# Patient Record
Sex: MALE | Race: WHITE
[De-identification: names, ages, dates, MRNs, and addresses within clinical notes are randomized per-mention and may not be internally consistent; named-entity substitution may affect disease eponyms.]

---

## 2017-02-20 ENCOUNTER — HOSPITAL ENCOUNTER (EMERGENCY)
Dept: HOSPITAL 62 - ER | Age: 22
Discharge: HOME | End: 2017-02-20
Payer: OTHER GOVERNMENT

## 2017-02-20 VITALS — DIASTOLIC BLOOD PRESSURE: 62 MMHG | SYSTOLIC BLOOD PRESSURE: 94 MMHG

## 2017-02-20 DIAGNOSIS — F17.210: ICD-10-CM

## 2017-02-20 DIAGNOSIS — H57.12: ICD-10-CM

## 2017-02-20 DIAGNOSIS — B30.9: Primary | ICD-10-CM

## 2017-02-20 PROCEDURE — 99283 EMERGENCY DEPT VISIT LOW MDM: CPT

## 2017-02-20 NOTE — ER DOCUMENT REPORT
ED Medical Screen (RME)





- General


Stated Complaint: EYE CONCERN


Mode of Arrival: Ambulatory


Information source: Patient


Notes: 


20 y/o M presents to ED c/o left eye redness, gritty feeling, and crusting over 

the last 2 days. States cannot recall obvious injury. 





I have greeted and performed a rapid initial assessment of this patient. A 


comprehensive ED assessment and evaluation of the patient, analysis of test 


results and completion of the medical decision making process will be conducted 


by additional ED providers.


TRAVEL OUTSIDE OF THE [place] IN LAST 30 DAYS: No





- Related Data


Allergies/Adverse Reactions: 


 





No Known Allergies Allergy (Verified 12/02/16 18:00)


 











Past Medical History





- Immunizations


Immunizations up to date: Yes





Physical Exam





- Vital signs


Vitals: 





 











Temp Pulse Resp BP Pulse Ox


 


 98.3 F   60   16   94/62 L  100 


 


 02/20/17 11:40  02/20/17 11:40  02/20/17 11:40  02/20/17 11:40  02/20/17 11:40














- General


General appearance: Appears well, Alert


In distress: None





Course





- Vital Signs


Vital signs: 





 











Temp Pulse Resp BP Pulse Ox


 


 98.3 F   60   16   94/62 L  100 


 


 02/20/17 11:40  02/20/17 11:40  02/20/17 11:40  02/20/17 11:40  02/20/17 11:40

## 2017-02-20 NOTE — ER DOCUMENT REPORT
HPI





- HPI


Patient complains to provider of: LEFT EYE IRRITATION


Onset: Other - 2 DAYS


Pain Level: 1


Context: 


Patient presents to the emergency department with complaints of left eye 

irritation.  He reports he was in the field 2 days ago when his left eye 

started irritating him.  He reports some discharge when he wakes up in the 

morning.  Denies wearing contacts denies trauma.  Denies problems with his 

vision.  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  He reports he started to go to his [facility] 

but they were too slow so he came here.  Symptoms such as fever vomiting 

diarrhea.


Associated Symptoms: None


Exacerbated by: Denies


Relieved by: Denies


Similar symptoms previously: No


Recently seen / treated by doctor: No





- DERM


Skin Color: Normal





Past Medical History





- General


Information source: Patient





- Social History


Smoking Status: Current Some Day Smoker


Cigarette use (# per day): Yes


Chew tobacco use (# tins/day): No


Frequency of alcohol use: Occasional


Drug Abuse: None


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


Lives with: Family


Family History: Reviewed & Not Pertinent


Patient has suicidal ideation: No


Patient has homicidal ideation: No





- Medical History


Medical History: Negative


Renal/ Medical History: Denies: Hx Peritoneal Dialysis


Surgical Hx: Negative





- Immunizations


Immunizations up to date: Yes





Vertical Provider Document





- CONSTITUTIONAL


Agree With Documented VS: Yes


Exam Limitations: No Limitations


General Appearance: WD/WN





- INFECTION CONTROL


TRAVEL OUTSIDE OF THE [place] IN LAST 30 DAYS: No





- HEENT


HEENT: Atraumatic, Normocephalic, PERRLA.  negative: Conjuctival Injection





- NECK


Neck: Supple





- RESPIRATORY


Respiratory: Breath Sounds Normal, No Respiratory Distress


O2 Sat by Pulse Oximetry: 100





- CARDIOVASCULAR


Cardiovascular: Regular Rate





- MUSCULOSKELETAL/EXTREMETIES


Musculoskeletal/Extremeties: MAEW, FROM





- NEURO


Level of Consciousness: Awake, Alert, Appropriate





- DERM


Integumentary: Warm, Dry





Course





- Re-evaluation


Re-evalutation: 


02/20/17  


Patient was instructed on viral conjunctivitis.  Patient was also instructed on 

importance of follow-up with his [facility] for recheck.


 Visual acuity was done with no deficits noted








- Vital Signs


Vital signs: 


 











Temp Pulse Resp BP Pulse Ox


 


 98.3 F   60   16   94/62 L  100 


 


 02/20/17 11:40  02/20/17 11:40  02/20/17 11:40  02/20/17 11:40  02/20/17 11:40














Procedures





- Eye Procedure


  ** Left


Eye Irrigated w/ Saline (ccs): 10


Alcaine Drops Administered: No - tetracaine


Fluorescein applied: Left


Notes: 





02/20/17 20:48


Patient reports immediate RELIEF OF IRRITATION after receiving tetracaine 





Discharge





- Discharge


Clinical Impression: 


 Irritation of left eye, Viral conjunctivitis of left eye





Condition: Stable


Disposition: HOME, SELF-CARE


Instructions:  Conjunctivitis, Allergic


Additional Instructions: 


*You have been evaluated for eye irritation, conjunctivitis


*Use refresh or clear eyes eye drops as INDICATED


*Follow up with your [facility] tomorrow for recheck


*Return to ED for worsening condition, changes, needs

## 2019-02-19 ENCOUNTER — HOSPITAL ENCOUNTER (EMERGENCY)
Dept: HOSPITAL 62 - ER | Age: 24
LOS: 1 days | Discharge: HOME | End: 2019-02-20
Payer: OTHER GOVERNMENT

## 2019-02-19 DIAGNOSIS — R10.84: ICD-10-CM

## 2019-02-19 DIAGNOSIS — R11.0: ICD-10-CM

## 2019-02-19 DIAGNOSIS — R42: ICD-10-CM

## 2019-02-19 DIAGNOSIS — E80.6: Primary | ICD-10-CM

## 2019-02-19 DIAGNOSIS — R53.1: ICD-10-CM

## 2019-02-19 LAB
ADD MANUAL DIFF: NO
ALBUMIN SERPL-MCNC: 5.3 G/DL (ref 3.5–5)
ALP SERPL-CCNC: 67 U/L (ref 38–126)
ALT SERPL-CCNC: 26 U/L (ref 21–72)
ANION GAP SERPL CALC-SCNC: 16 MMOL/L (ref 5–19)
APPEARANCE UR: CLEAR
APTT PPP: YELLOW S
AST SERPL-CCNC: 19 U/L (ref 17–59)
BASOPHILS # BLD AUTO: 0 10^3/UL (ref 0–0.2)
BASOPHILS NFR BLD AUTO: 0.1 % (ref 0–2)
BILIRUB DIRECT SERPL-MCNC: 0.2 MG/DL (ref 0–0.4)
BILIRUB SERPL-MCNC: 2.6 MG/DL (ref 0.2–1.3)
BILIRUB UR QL STRIP: NEGATIVE
BUN SERPL-MCNC: 8 MG/DL (ref 7–20)
CALCIUM: 10.1 MG/DL (ref 8.4–10.2)
CHLORIDE SERPL-SCNC: 99 MMOL/L (ref 98–107)
CO2 SERPL-SCNC: 25 MMOL/L (ref 22–30)
EOSINOPHIL # BLD AUTO: 0.3 10^3/UL (ref 0–0.6)
EOSINOPHIL NFR BLD AUTO: 3.5 % (ref 0–6)
ERYTHROCYTE [DISTWIDTH] IN BLOOD BY AUTOMATED COUNT: 13.4 % (ref 11.5–14)
GLUCOSE SERPL-MCNC: 77 MG/DL (ref 75–110)
GLUCOSE UR STRIP-MCNC: NEGATIVE MG/DL
HCT VFR BLD CALC: 45.4 % (ref 37.9–51)
HGB BLD-MCNC: 15.9 G/DL (ref 13.5–17)
KETONES UR STRIP-MCNC: 80 MG/DL
LIPASE SERPL-CCNC: 58.4 U/L (ref 23–300)
LYMPHOCYTES # BLD AUTO: 3.3 10^3/UL (ref 0.5–4.7)
LYMPHOCYTES NFR BLD AUTO: 44.6 % (ref 13–45)
MCH RBC QN AUTO: 30 PG (ref 27–33.4)
MCHC RBC AUTO-ENTMCNC: 35.1 G/DL (ref 32–36)
MCV RBC AUTO: 86 FL (ref 80–97)
MONOCYTES # BLD AUTO: 0.7 10^3/UL (ref 0.1–1.4)
MONOCYTES NFR BLD AUTO: 9.4 % (ref 3–13)
NEUTROPHILS # BLD AUTO: 3.1 10^3/UL (ref 1.7–8.2)
NEUTS SEG NFR BLD AUTO: 42.4 % (ref 42–78)
NITRITE UR QL STRIP: NEGATIVE
PH UR STRIP: 5 [PH] (ref 5–9)
PLATELET # BLD: 253 10^3/UL (ref 150–450)
POTASSIUM SERPL-SCNC: 4.2 MMOL/L (ref 3.6–5)
PROT SERPL-MCNC: 7.6 G/DL (ref 6.3–8.2)
PROT UR STRIP-MCNC: NEGATIVE MG/DL
RBC # BLD AUTO: 5.3 10^6/UL (ref 4.35–5.55)
SODIUM SERPL-SCNC: 139.5 MMOL/L (ref 137–145)
SP GR UR STRIP: 1.01
TOTAL CELLS COUNTED % (AUTO): 100 %
UROBILINOGEN UR-MCNC: NEGATIVE MG/DL (ref ?–2)
WBC # BLD AUTO: 7.4 10^3/UL (ref 4–10.5)

## 2019-02-19 PROCEDURE — 36415 COLL VENOUS BLD VENIPUNCTURE: CPT

## 2019-02-19 PROCEDURE — 80053 COMPREHEN METABOLIC PANEL: CPT

## 2019-02-19 PROCEDURE — 76705 ECHO EXAM OF ABDOMEN: CPT

## 2019-02-19 PROCEDURE — 85025 COMPLETE CBC W/AUTO DIFF WBC: CPT

## 2019-02-19 PROCEDURE — 83690 ASSAY OF LIPASE: CPT

## 2019-02-19 PROCEDURE — 81001 URINALYSIS AUTO W/SCOPE: CPT

## 2019-02-19 PROCEDURE — 99284 EMERGENCY DEPT VISIT MOD MDM: CPT

## 2019-02-19 NOTE — RADIOLOGY REPORT (SQ)
EXAM DESCRIPTION: 



US ABDOMEN LIMITED



COMPLETED DATE/TME:  02/19/2019 22:31



CLINICAL HISTORY: 



23 years, Male, Abdominal pain



COMPARISON:

None.



TECHNIQUE:

Limited right upper quadrant ultrasound



LIMITATIONS:

None.



FINDINGS:



The liver is homogenous in echotexture without focal lesion. No

gallstones or gallbladder wall thickening. The CBD measures 2.4

mm. The visualized pancreas, right kidney are unremarkable. No

ascites. Visualized abdominal aorta and inferior vena cava are

unremarkable.



IMPRESSION:



Unremarkable exam

 



copyright 2011 [employer]- All Rights Reserved

## 2019-02-19 NOTE — ER DOCUMENT REPORT
ED Dizziness/Weakness





- General


Chief Complaint: Dizziness


Stated Complaint: WEAK,NAUSEA


Time Seen by Provider: 02/19/19 21:40


Primary Care Provider: 


CHE EPPERSON MD [ACTIVE STAFF] - Follow up as needed


Mode of Arrival: Ambulatory


Information source: Patient


Notes: 





HISTORY OF PRESENT ILLNESS:





Patient is a 23-year-old male with no significant past medical history who 

presents with nausea and right upper quadrant abdominal pain.





Location: Right upper quadrant


Onset: Gradual


Alleviation: "Some positions are better"


Provocation: None


Quality: Aching, tightness


Radiation: None


Severity: Moderate at worst, currently mild


Timing: Intermittent


History of abdominal surgery: None


Associated symptoms: Denies fevers or chills, no diarrhea or constipation, no 

known sick contacts, no new medications, no recent travel


Last bowel movement: Today and normal











REVIEW OF SYSTEMS:





CONSTITUTIONAL : Denies fever or chills, no sweats.  Denies recent illness.


EENT:   Denies eye, ear, throat, or mouth pain or symptoms.  Denies nasal or 

sinus congestion.


CARDIOVASCULAR: Denies chest pain.  Denies swelling of the legs.


RESPIRATORY: Denies cough, cold, or chest congestion.  Denies shortness of 

breath or difficulty breathing.  Denies wheezing.


GASTROINTESTINAL: Positive for abdominal pain.  Positive for nausea but no 

vomiting or diarrhea.  Denies constipation. 


GENITOURINARY: Denies difficulty urinating, painful urination, burning, 

frequency, or blood in urine.


MUSCULOSKELETAL:  Denies neck or back pain or joint pain or swelling.


SKIN:   Denies rash or skin lesions.


HEMATOLOGIC :   Denies easy bruising or bleeding.


LYMPHATIC:  Denies swollen, enlarged glands.


NEUROLOGICAL:  Denies altered mental status or loss of consciousness.  Denies 

headache.  Denies weakness or paralysis or loss of use of either side.  Denies 

problems with gait or speech.  Denies sensory or motor loss.


PSYCHIATRIC:  Denies anxiety or stress or depression.





All other systems reviewed and negative.











PHYSICAL EXAMINATION:





GENERAL: Well-appearing, well-nourished and in no acute distress.


HEAD: Atraumatic, normocephalic. No scalp deformity, depression, or crepitance.


EYES: Pupils are 3 mm and equal/round/reactive to light, extraocular movements 

intact, sclera anicteric, conjunctiva are normal.


ENT: Nares patent bilaterally, oropharynx.  Moist mucous membranes. No tonsil 

hypertrophy.


NECK: Normal range of motion, supple without lymphadenopathy.


LUNGS: Breath sounds present, equal, and clear to auscultation bilaterally.  No 

wheezes, rales, or rhonchi.


HEART: Regular rate and rhythm without murmurs, rubs, or gallops.  2+ peripheral

pulses.  Normal capillary refill.


ABDOMEN: Soft and nondistended, mild right upper quadrant tenderness without 

peritoneal signs. Normoactive bowel sounds.  No guarding, no rebound.  No masses

appreciated.


BACK: Normal contour, no midline tenderness. Rectal exam deferred.


GENITAL: Deferred.


EXTREMITIES: Normal range of motion, no pitting or edema.  No cyanosis.


NEUROLOGICAL: No focal neurological deficits. Moves all extremities 

spontaneously and on command.


PSYCH: Normal mood, normal affect.  No suicidal thoughts/ideations.  No 

homocidal thoughts/ideations.  No hallucinations.


SKIN: Warm, dry, normal turgor, no rashes or lesions noted.











ASSESSMENT AND PLAN:





This patient is a 23-year-old male who presents with right upper quadrant abdomi

nal pain and nausea.





1. Will obtain labs, urine, lipase, and right upper quadrant ultrasound.


2. Will continue to reassess and medicate as needed for symptomatic improvement.


TRAVEL OUTSIDE OF THE [place] IN LAST 30 DAYS: No





- Related Data


Allergies/Adverse Reactions: 


                                        





No Known Allergies Allergy (Verified 02/19/19 18:11)


   











Past Medical History





- General


Information source: Patient, Relative





- Social History


Smoking Status: Never Smoker


Chew tobacco use (# tins/day): No


Frequency of alcohol use: None


Drug Abuse: None


Lives with: Family


Family History: Reviewed & Not Pertinent


Patient has suicidal ideation: No


Patient has homicidal ideation: No





- Medical History


Medical History: Negative





- Past Medical History


Cardiac Medical History: Reports: None


Pulmonary Medical History: Reports: None


EENT Medical History: Reports: None


Neurological Medical History: Reports: None


Endocrine Medical History: Reports: None


Renal/ Medical History: Reports: None.  Denies: Hx Peritoneal Dialysis


Malignancy Medical History: Reports None


GI Medical History: Reports: None


Musculoskeletal Medical History: Reports None


Skin Medical History: Reports None


Psychiatric Medical History: Reports: None


Traumatic Medical History: Reports: None


Infectious Medical History: Reports: None


Surgical Hx: Negative


Past Surgical History: Reports: None





- Immunizations


Immunizations up to date: Yes


Hx Diphtheria, Pertussis, Tetanus Vaccination: Yes


History of Influenza Vaccine for 10/2017 - 3/2018 Season: Yes





Physical Exam





- Vital signs


Vitals: 


                                        











Temp Pulse Resp BP Pulse Ox


 


 98.6 F   76   16   127/59 H  100 


 


 02/19/19 16:57  02/19/19 16:57  02/19/19 16:57  02/19/19 16:57  02/19/19 16:57














Course





- Re-evaluation


Re-evalutation: 





02/20/19 01:11


Blood work shows mild elevation of total bilirubin, however direct bilirubin is 

normal.  This makes intrahepatic source most likely.  Blood work otherwise is 

unremarkable.  Ultrasound does not show evidence of intrahepatic echo genetic 

variation or difference to suggest fatty liver or other etiology.  Patient will 

need to follow-up with gastroenterology for further workup.  He will be 

discharged home with return precautions and follow-up through the .  

Patient voices both understanding and agreeing with the plan.





- Vital Signs


Vital signs: 


                                        











Temp Pulse Resp BP Pulse Ox


 


 97.9 F   75   16   123/64   98 


 


 02/20/19 01:27  02/20/19 01:27  02/20/19 01:27  02/20/19 01:27  02/20/19 01:27














- Laboratory


Result Diagrams: 


                                 02/19/19 19:10





                                 02/19/19 19:10


Laboratory results interpreted by me: 


                                        











  02/19/19 02/19/19





  19:10 19:10


 


Total Bilirubin  2.6 H 


 


Albumin  5.3 H 


 


Urine Ketones   80 H














- Diagnostic Test


Radiology reviewed: Image reviewed, Reports reviewed





Discharge





- Discharge


Clinical Impression: 


 Total bilirubin, elevated





Abdominal pain


Qualifiers:


 Abdominal location: generalized Qualified Code(s): R10.84 - Generalized 

abdominal pain





Condition: Good


Disposition: HOME, SELF-CARE


Instructions:  Abdominal Pain ([facility])


Additional Instructions: 


You have been evaluated in the Emergency Department for abdominal pain.  Your 

blood work shows mild elevation of your total bilirubin, which is a breakdown 

product of blood cells that is processed through the liver.  Your blood work 

otherwise was unremarkable, and an ultrasound showed no swelling or changes in 

your liver to explain your blood counts.  Please follow-up with your primary 

physician and a gastroenterologist as soon as possible for further workup. 

Return to the Emergency Department if you experience fevers, worsening pain, 

generalized itching, changes in the color of your stools, or any other 

concerning symptoms.


Prescriptions: 


Ondansetron [Zofran Odt 4 mg Tablet] 1 tab PO Q6HP PRN #30 tab.rapdis


 PRN Reason: For Nausea/Vomiting


Tramadol HCl [Ultram 50 mg Tablet] 50 mg PO Q6HP PRN #28 tablet


 PRN Reason: For Pain


Referrals: 


CHE EPPERSON MD [ACTIVE STAFF] - Follow up as needed


Print Language: English

## 2019-02-19 NOTE — ER DOCUMENT REPORT
ED Medical Screen (RME)





- General


Chief Complaint: Dizziness


Stated Complaint: WEAK,NAUSEA


Time Seen by Provider: 02/19/19 18:30


Mode of Arrival: Ambulatory


Information source: Patient


Notes: 





Patient is a 23-year-old male who presents the emergency department with 

lightheadedness, weakness, nausea and what feels like the room spinning around 

him.  He denies any vomiting or diarrhea.  He reports no fevers.  Patient states

his symptoms have been going on for 2 days.  He denies any past medical or 

surgical history.





Exam:


Patient alert, oriented and in no acute distress.


No neurological deficits noted. 





I have greeted and performed a rapid initial assessment of this patient.  A 

comprehensive ED assessment and evaluation of the patient, analysis of test 

results and completion of the medical decision making process will be conducted 

by additional ED providers.  Dictation of this chart was performed using voice 

recognition software; therefore, there may be some unintended grammatical 

errors.


TRAVEL OUTSIDE OF THE [place] IN LAST 30 DAYS: No





- Related Data


Allergies/Adverse Reactions: 


                                        





No Known Allergies Allergy (Verified 02/19/19 18:11)


   











Past Medical History





- Social History


Frequency of alcohol use: None


Drug Abuse: None


Renal/ Medical History: Denies: Hx Peritoneal Dialysis





- Immunizations


Immunizations up to date: Yes





Physical Exam





- Vital signs


Vitals: 





                                        











Temp Pulse Resp BP Pulse Ox


 


 98.6 F   76   16   127/59 H  100 


 


 02/19/19 16:57  02/19/19 16:57  02/19/19 16:57  02/19/19 16:57  02/19/19 16:57














Course





- Vital Signs


Vital signs: 





                                        











Temp Pulse Resp BP Pulse Ox


 


 98.6 F   76   16   127/59 H  100 


 


 02/19/19 16:57  02/19/19 16:57  02/19/19 16:57  02/19/19 16:57  02/19/19 16:57

## 2019-02-20 VITALS — DIASTOLIC BLOOD PRESSURE: 64 MMHG | SYSTOLIC BLOOD PRESSURE: 123 MMHG
